# Patient Record
(demographics unavailable — no encounter records)

---

## 2025-01-06 NOTE — DISCUSSION/SUMMARY
[de-identified] : I reviewed the x-ray findings with the patient.  Send authorization for an MRI of the left knee to evaluate for an ACL tear and lateral meniscus tear.  He has an effusion on exam, lateral joint tenderness to palpation with positive Sergio's laterally and his knee feels loose with Lachman testing compared to the right knee.  He has x-rays that show no fracture or dislocation and well-preserved medial and lateral compartments.  He will start home therapy exercises for the left knee, he was given a printout from the [employer] for a knee conditioning program.  He will start anti-inflammatory medication, Rx for meloxicam sent to the pharmacy.  He will take meloxicam 1 tablet a day for 2 weeks and then on a as needed basis.  He will call me 3 to 4 days after the MRI is performed so we can discuss the results, I will see him in 6 weeks for further evaluation.

## 2025-01-06 NOTE — DATA REVIEWED
[Left] : left [Knee] : knee [FreeTextEntry1] : 2 views of the left knee were obtained here in the office today and show: No fracture or dislocation.  Well-preserved medial and lateral compartments.  No soft tissue calcifications or bone masses.

## 2025-01-06 NOTE — IMAGING
[de-identified] : Physical exam of the left knee: Mild effusion.  No erythema or ecchymosis.  He can straight leg raise.  Left knee flexion to 120 degrees, full extension.  No tenderness to palpation over the medial joint line, he has tenderness to palpation of the lateral joint line.  No tenderness to palpation over the anterior aspect of the knee.  Stable varus and valgus stress testing.  Positive Sergio's laterally.  His left knee feels loose with Lachman testing compared to the right knee.  Intact to light touch, nonantalgic gait.

## 2025-01-06 NOTE — HISTORY OF PRESENT ILLNESS
[de-identified] : The patient is a 27-year-old male here for an evaluation of his left knee.  He was injured on 12/5/2024 while playing basketball, he fell directly onto his knee.  He developed immediate pain and swelling.  He was seen at a [facility] which did not have an x-ray.  He was given an Ace wrap which he used for a couple of weeks.  The swelling did resolve but it returned two weeks ago with no additional trauma to the knee.  He reports pain and swelling if he ambulates for too long.

## 2025-02-18 NOTE — HISTORY OF PRESENT ILLNESS
[de-identified] : The patient is a 27-year-old male here for a subsequent reevaluation of his left knee.  MRI of the left knee showed marrow edema throughout the superior and central patella measuring 2.8 cm.  No ligamentous tear, no meniscus tear or chondral defect.  His knee has improved.  He is able to play basketball but he cannot play for as long a period of time.  He occasionally takes dual action Advil for the pain which helps him.

## 2025-02-18 NOTE — IMAGING
[de-identified] : Physical exam of the left knee: No effusion, no erythema or ecchymosis.  Good range of motion with flexion and extension.  No tenderness to palpation over the joint lines.  No tenderness to palpation over the collateral ligaments.  He has tenderness to palpation of the superior aspect of the patella.  Negative Sergio's testing, negative Lachman testing.  Stable to varus and valgus stress testing.  Intact to light touch, nonantalgic gait.

## 2025-02-18 NOTE — DISCUSSION/SUMMARY
[de-identified] : At this point I recommend he does home therapy exercises, he was given a printout from [employer] for a knee conditioning program.  He may continue taking Advil on a as needed basis for the knee.  I did explain to him that playing basketball will repetitively stress his knee.  He understands this may take a few months to fully resolve.  I will see him back in 2 months for further evaluation.